# Patient Record
Sex: FEMALE | Race: BLACK OR AFRICAN AMERICAN | NOT HISPANIC OR LATINO
[De-identification: names, ages, dates, MRNs, and addresses within clinical notes are randomized per-mention and may not be internally consistent; named-entity substitution may affect disease eponyms.]

---

## 2020-11-12 PROBLEM — Z00.00 ENCOUNTER FOR PREVENTIVE HEALTH EXAMINATION: Status: ACTIVE | Noted: 2020-11-12

## 2020-11-16 DIAGNOSIS — R07.89 OTHER CHEST PAIN: ICD-10-CM

## 2020-11-16 DIAGNOSIS — R06.02 SHORTNESS OF BREATH: ICD-10-CM

## 2020-11-17 ENCOUNTER — APPOINTMENT (OUTPATIENT)
Dept: ELECTROPHYSIOLOGY | Facility: CLINIC | Age: 30
End: 2020-11-17
Payer: COMMERCIAL

## 2020-11-17 ENCOUNTER — APPOINTMENT (OUTPATIENT)
Dept: ELECTROPHYSIOLOGY | Facility: CLINIC | Age: 30
End: 2020-11-17

## 2020-11-17 ENCOUNTER — NON-APPOINTMENT (OUTPATIENT)
Age: 30
End: 2020-11-17

## 2020-11-17 VITALS
OXYGEN SATURATION: 97 % | HEIGHT: 65 IN | DIASTOLIC BLOOD PRESSURE: 98 MMHG | SYSTOLIC BLOOD PRESSURE: 139 MMHG | WEIGHT: 265 LBS | HEART RATE: 115 BPM | BODY MASS INDEX: 44.15 KG/M2

## 2020-11-17 DIAGNOSIS — Z82.49 FAMILY HISTORY OF ISCHEMIC HEART DISEASE AND OTHER DISEASES OF THE CIRCULATORY SYSTEM: ICD-10-CM

## 2020-11-17 DIAGNOSIS — Z87.09 PERSONAL HISTORY OF OTHER DISEASES OF THE RESPIRATORY SYSTEM: ICD-10-CM

## 2020-11-17 DIAGNOSIS — I10 ESSENTIAL (PRIMARY) HYPERTENSION: ICD-10-CM

## 2020-11-17 DIAGNOSIS — Z83.438 FAMILY HISTORY OF OTHER DISORDER OF LIPOPROTEIN METABOLISM AND OTHER LIPIDEMIA: ICD-10-CM

## 2020-11-17 DIAGNOSIS — Z78.9 OTHER SPECIFIED HEALTH STATUS: ICD-10-CM

## 2020-11-17 PROCEDURE — 99205 OFFICE O/P NEW HI 60 MIN: CPT

## 2020-11-17 PROCEDURE — 93000 ELECTROCARDIOGRAM COMPLETE: CPT

## 2020-11-20 NOTE — PHYSICAL EXAM
[General Appearance - Well Developed] : well developed [Normal Appearance] : normal appearance [Well Groomed] : well groomed [General Appearance - Well Nourished] : well nourished [No Deformities] : no deformities [General Appearance - In No Acute Distress] : no acute distress [Normal Oral Mucosa] : normal oral mucosa [No Oral Pallor] : no oral pallor [No Oral Cyanosis] : no oral cyanosis [Normal Jugular Venous A Waves Present] : normal jugular venous A waves present [Normal Jugular Venous V Waves Present] : normal jugular venous V waves present [No Jugular Venous Cooper A Waves] : no jugular venous cooper A waves [Heart Rate And Rhythm] : heart rate and rhythm were normal [Heart Sounds] : normal S1 and S2 [Murmurs] : no murmurs present [Respiration, Rhythm And Depth] : normal respiratory rhythm and effort [Exaggerated Use Of Accessory Muscles For Inspiration] : no accessory muscle use [Auscultation Breath Sounds / Voice Sounds] : lungs were clear to auscultation bilaterally [Abdomen Soft] : soft [Abdomen Tenderness] : non-tender [Abdomen Mass (___ Cm)] : no abdominal mass palpated [Abnormal Walk] : normal gait [Gait - Sufficient For Exercise Testing] : the gait was sufficient for exercise testing [Nail Clubbing] : no clubbing of the fingernails [Cyanosis, Localized] : no localized cyanosis [Petechial Hemorrhages (___cm)] : no petechial hemorrhages [Skin Color & Pigmentation] : normal skin color and pigmentation [] : no rash [No Venous Stasis] : no venous stasis [Skin Lesions] : no skin lesions [No Skin Ulcers] : no skin ulcer [No Xanthoma] : no  xanthoma was observed [Oriented To Time, Place, And Person] : oriented to person, place, and time [Affect] : the affect was normal [Mood] : the mood was normal [No Anxiety] : not feeling anxious

## 2020-11-20 NOTE — DISCUSSION/SUMMARY
[FreeTextEntry1] : Ms. Rosado is a very pleasant 30 year old woman with symptomatic ventricular arrhythmia, ie symptomatic NSVT on treadmill testing.  At this time we will start a low dose beta blocker.  I suggested a loop monitor to better qualify burden.   We will also do an MRI to better understand substrate, ie assure no scar.  We discussed possible EPS/ablation but such an intervention will depend on the evaluation.

## 2020-11-20 NOTE — HISTORY OF PRESENT ILLNESS
[FreeTextEntry1] : 30 year old woman with newly diagnosed hypertension went to see an MD for an evaluation of palpitations.  Symptoms are intermittent and date back approximately 2 months.  As part of her evaluation she had an ETT.  During the test she had symptoms that correlated to a run of ventricular tachycardia.  No lightheadedness/dizziness.  NO chest pain or shortness of breath.  She rides the [place] without complaints, but does note occasional shortness of breath while walking. \par \par Her father, who she did not live with, had a pacemaker and  at the age of 56.  She says that he had a "heart attack" but cannot elucidate any better.

## 2020-12-18 ENCOUNTER — RX RENEWAL (OUTPATIENT)
Age: 30
End: 2020-12-18

## 2021-01-07 ENCOUNTER — TRANSCRIPTION ENCOUNTER (OUTPATIENT)
Age: 31
End: 2021-01-07

## 2021-01-07 ENCOUNTER — OUTPATIENT (OUTPATIENT)
Dept: OUTPATIENT SERVICES | Facility: HOSPITAL | Age: 31
LOS: 1 days | End: 2021-01-07
Payer: COMMERCIAL

## 2021-01-07 ENCOUNTER — RESULT REVIEW (OUTPATIENT)
Age: 31
End: 2021-01-07

## 2021-01-07 ENCOUNTER — APPOINTMENT (OUTPATIENT)
Dept: MRI IMAGING | Facility: CLINIC | Age: 31
End: 2021-01-07
Payer: COMMERCIAL

## 2021-01-07 DIAGNOSIS — Z00.8 ENCOUNTER FOR OTHER GENERAL EXAMINATION: ICD-10-CM

## 2021-01-07 PROCEDURE — A9585: CPT

## 2021-01-07 PROCEDURE — 75561 CARDIAC MRI FOR MORPH W/DYE: CPT | Mod: 26

## 2021-01-07 PROCEDURE — 75561 CARDIAC MRI FOR MORPH W/DYE: CPT

## 2021-01-11 NOTE — PHYSICAL EXAM
[General Appearance - Well Developed] : well developed [Normal Appearance] : normal appearance [Well Groomed] : well groomed [General Appearance - Well Nourished] : well nourished [No Deformities] : no deformities [General Appearance - In No Acute Distress] : no acute distress [Normal Oral Mucosa] : normal oral mucosa [No Oral Pallor] : no oral pallor [No Oral Cyanosis] : no oral cyanosis [Normal Jugular Venous A Waves Present] : normal jugular venous A waves present [Normal Jugular Venous V Waves Present] : normal jugular venous V waves present [No Jugular Venous Cooper A Waves] : no jugular venous cooper A waves [Respiration, Rhythm And Depth] : normal respiratory rhythm and effort [Exaggerated Use Of Accessory Muscles For Inspiration] : no accessory muscle use [Auscultation Breath Sounds / Voice Sounds] : lungs were clear to auscultation bilaterally [Heart Rate And Rhythm] : heart rate and rhythm were normal [Heart Sounds] : normal S1 and S2 [Murmurs] : no murmurs present [Abdomen Soft] : soft [Abdomen Tenderness] : non-tender [Abdomen Mass (___ Cm)] : no abdominal mass palpated [Abnormal Walk] : normal gait [Gait - Sufficient For Exercise Testing] : the gait was sufficient for exercise testing [Nail Clubbing] : no clubbing of the fingernails [Cyanosis, Localized] : no localized cyanosis [Petechial Hemorrhages (___cm)] : no petechial hemorrhages [Skin Color & Pigmentation] : normal skin color and pigmentation [] : no rash [No Venous Stasis] : no venous stasis [Skin Lesions] : no skin lesions [No Skin Ulcers] : no skin ulcer [No Xanthoma] : no  xanthoma was observed [Oriented To Time, Place, And Person] : oriented to person, place, and time [Affect] : the affect was normal [Mood] : the mood was normal [No Anxiety] : not feeling anxious

## 2021-01-12 ENCOUNTER — APPOINTMENT (OUTPATIENT)
Dept: ELECTROPHYSIOLOGY | Facility: CLINIC | Age: 31
End: 2021-01-12
Payer: COMMERCIAL

## 2021-01-12 ENCOUNTER — NON-APPOINTMENT (OUTPATIENT)
Age: 31
End: 2021-01-12

## 2021-01-12 VITALS
DIASTOLIC BLOOD PRESSURE: 88 MMHG | HEIGHT: 65 IN | HEART RATE: 84 BPM | RESPIRATION RATE: 14 BRPM | BODY MASS INDEX: 44.15 KG/M2 | OXYGEN SATURATION: 95 % | SYSTOLIC BLOOD PRESSURE: 141 MMHG | WEIGHT: 265 LBS

## 2021-01-12 PROCEDURE — 99072 ADDL SUPL MATRL&STAF TM PHE: CPT

## 2021-01-12 PROCEDURE — 93000 ELECTROCARDIOGRAM COMPLETE: CPT

## 2021-01-12 PROCEDURE — 99213 OFFICE O/P EST LOW 20 MIN: CPT

## 2021-01-12 NOTE — DISCUSSION/SUMMARY
[FreeTextEntry1] : Ms. Rosado is a very pleasant 30 year old woman with symptomatic ventricular arrhythmia, ie symptomatic NSVT on treadmill testing.  Evaluation has proven a normal heart (MRI) and she has had a good response to beta blocker therapy, ie Zio with no NSVT, low burden of VE.  While we may consider ablation, the paucity of beats would make mapping difficult and translate to a low likelihood for success.  She requires no further EP evaluation prior to her planned bariatric procedure.

## 2021-01-12 NOTE — HISTORY OF PRESENT ILLNESS
[FreeTextEntry1] : No complaints. No longer having palpitations. She continues to do her exercise 4x/week.  NO change in exercise/effort tolerance.  No lightheadedness/dizziness.

## 2021-01-14 ENCOUNTER — RX RENEWAL (OUTPATIENT)
Age: 31
End: 2021-01-14

## 2021-01-28 DIAGNOSIS — Z01.818 ENCOUNTER FOR OTHER PREPROCEDURAL EXAMINATION: ICD-10-CM

## 2021-02-05 ENCOUNTER — APPOINTMENT (OUTPATIENT)
Dept: DISASTER EMERGENCY | Facility: CLINIC | Age: 31
End: 2021-02-05

## 2021-02-06 LAB — SARS-COV-2 N GENE NPH QL NAA+PROBE: NOT DETECTED

## 2021-02-08 ENCOUNTER — RESULT REVIEW (OUTPATIENT)
Age: 31
End: 2021-02-08

## 2021-07-13 ENCOUNTER — APPOINTMENT (OUTPATIENT)
Dept: ELECTROPHYSIOLOGY | Facility: CLINIC | Age: 31
End: 2021-07-13

## 2021-10-06 PROBLEM — I10 ESSENTIAL HYPERTENSION: Status: ACTIVE | Noted: 2020-11-17

## 2022-01-13 ENCOUNTER — ASOB RESULT (OUTPATIENT)
Age: 32
End: 2022-01-13

## 2022-01-13 ENCOUNTER — APPOINTMENT (OUTPATIENT)
Dept: ANTEPARTUM | Facility: CLINIC | Age: 32
End: 2022-01-13
Payer: COMMERCIAL

## 2022-01-13 PROCEDURE — 76813 OB US NUCHAL MEAS 1 GEST: CPT | Mod: 59

## 2022-01-13 PROCEDURE — 76801 OB US < 14 WKS SINGLE FETUS: CPT

## 2022-02-24 ENCOUNTER — APPOINTMENT (OUTPATIENT)
Dept: MATERNAL FETAL MEDICINE | Facility: CLINIC | Age: 32
End: 2022-02-24

## 2022-03-04 ENCOUNTER — RX RENEWAL (OUTPATIENT)
Age: 32
End: 2022-03-04

## 2022-03-14 ENCOUNTER — APPOINTMENT (OUTPATIENT)
Dept: ANTEPARTUM | Facility: CLINIC | Age: 32
End: 2022-03-14
Payer: COMMERCIAL

## 2022-03-14 ENCOUNTER — ASOB RESULT (OUTPATIENT)
Age: 32
End: 2022-03-14

## 2022-03-14 PROCEDURE — 99203 OFFICE O/P NEW LOW 30 MIN: CPT | Mod: 25

## 2022-03-14 PROCEDURE — 76811 OB US DETAILED SNGL FETUS: CPT

## 2022-04-07 ENCOUNTER — APPOINTMENT (OUTPATIENT)
Dept: ANTEPARTUM | Facility: CLINIC | Age: 32
End: 2022-04-07
Payer: COMMERCIAL

## 2022-04-07 ENCOUNTER — ASOB RESULT (OUTPATIENT)
Age: 32
End: 2022-04-07

## 2022-04-07 PROCEDURE — 76816 OB US FOLLOW-UP PER FETUS: CPT

## 2022-04-13 ENCOUNTER — NON-APPOINTMENT (OUTPATIENT)
Age: 32
End: 2022-04-13

## 2022-05-02 ENCOUNTER — APPOINTMENT (OUTPATIENT)
Dept: ANTEPARTUM | Facility: CLINIC | Age: 32
End: 2022-05-02
Payer: COMMERCIAL

## 2022-05-02 ENCOUNTER — ASOB RESULT (OUTPATIENT)
Age: 32
End: 2022-05-02

## 2022-05-02 PROCEDURE — 99215 OFFICE O/P EST HI 40 MIN: CPT | Mod: 25

## 2022-05-02 PROCEDURE — 76819 FETAL BIOPHYS PROFIL W/O NST: CPT

## 2022-05-02 PROCEDURE — 76816 OB US FOLLOW-UP PER FETUS: CPT

## 2022-05-23 ENCOUNTER — APPOINTMENT (OUTPATIENT)
Dept: PEDIATRIC UROLOGY | Facility: CLINIC | Age: 32
End: 2022-05-23
Payer: COMMERCIAL

## 2022-05-23 DIAGNOSIS — O35.9XX0 MATERNAL CARE FOR (SUSPECTED) FETAL ABNORMALITY AND DAMAGE, UNSPECIFIED, NOT APPLICABLE OR UNSPECIFIED: ICD-10-CM

## 2022-05-23 PROCEDURE — 99203 OFFICE O/P NEW LOW 30 MIN: CPT | Mod: 95

## 2022-05-23 NOTE — HISTORY OF PRESENT ILLNESS
[Home] : at home, [unfilled] , at the time of the visit. [Medical Office: ([facility])___] : at the medical office located in  [Verbal consent obtained from patient] : the patient, [unfilled] [TextBox_4] : I verified the identity of the patient and the reason for the appointment with the parent.  I explained  to the parent that telemedicine encounters are not the same as a direct patient/healthcare provider visit because the patient and healthcare provider are not in the same room, which can result in limitations, including with the physical examination.  I explained that the telemedicine encounter may require the patient’s genitalia to be shown.  I explained that after the telemedicine encounter, the patient may require an office visit for an in-person physical examination, ultrasound or other testing.  I informed the parent that there may be privacy risks associated with the use of the technology and that there may be costs associated with the encounter. I offered the option of an office visit rather than a telemedicine encounter.   Parent stated that all explanations were understood, and that all questions were answered to their satisfaction.  The parent verbalized their preference and consent to proceed with the telemedicine encounter.\par \par \par ARELI is here for an initial consultation. She is 31 weeks pregnant with her 1st pregnancy conceived naturally.  Right sided hydronephrosis was detected in utero at 20 weeks at 6.8mm, increased to 8.3mm at 24 weeks with a right lower pole simple cyst noted as well. At 28 weeks gestation there was no renal dilation noted, but the right lower pole simple cyst remained. The bladder has been noted to be empty on ultrasound and the amniotic fluid levels are normal. No other anomalies have been noted.  \par

## 2022-05-23 NOTE — ASSESSMENT
[FreeTextEntry1] : The female fetus as right sided hydronephrosis and possible lower pole cyst.  I discussed the anatomy using diagrams as well as the possible causes that will be determined after birth.  I recommended maintaining the pregnancy and avoiding early delivery given normal levels of amniotic fluid.\par \par The  care will include:\par 1. no prophylactic antibiotics\par 2. no ultrasound is needed after birth at the hospital\par 3. 1st renal-bladder sonogram will take place at the office visit at about 2 weeks of age. The imaging studies can be performed earlier if necessary. After better defining the anomaly, we can then proceed with any additional testing or procedures. I did outline these in general terms. \par \par All questions were answered. We will reconvene prior to the delivery or at the 2 week visit after the baby is born. \par

## 2022-05-23 NOTE — CONSULT LETTER
[FreeTextEntry1] : Dear Dr. Reyes ,\par \par I had the pleasure of consulting on ARELI RAMOSAND today.  Below is my note regarding the office visit today.\par \par Thank you so very much for allowing me to participate in ARELI's  care.  Please don't hesitate to call me should any questions or issues arise .\par \par Sincerely, \par \par Aleksandr\par \par Aleksandr Contreras MD, FACS, FSPU\par Chief, Pediatric Urology\par Professor of Urology and Pediatrics\par [employer] [employer] [facility] [employer]\par \par President, [employer] - [place] Section\par Past-President, Soci[employer] Pediatric U[employer]

## 2022-05-23 NOTE — REASON FOR VISIT
[Initial Consultation] : an initial consultation [Patient] : patient [TextBox_50] : in utero consultation

## 2022-06-02 ENCOUNTER — ASOB RESULT (OUTPATIENT)
Age: 32
End: 2022-06-02

## 2022-06-02 ENCOUNTER — APPOINTMENT (OUTPATIENT)
Dept: ANTEPARTUM | Facility: CLINIC | Age: 32
End: 2022-06-02
Payer: COMMERCIAL

## 2022-06-02 PROCEDURE — 76819 FETAL BIOPHYS PROFIL W/O NST: CPT

## 2022-06-02 PROCEDURE — 76816 OB US FOLLOW-UP PER FETUS: CPT

## 2022-07-01 ENCOUNTER — APPOINTMENT (OUTPATIENT)
Dept: ANTEPARTUM | Facility: CLINIC | Age: 32
End: 2022-07-01

## 2022-07-01 PROCEDURE — 99213 OFFICE O/P EST LOW 20 MIN: CPT | Mod: 25

## 2022-07-01 PROCEDURE — 76819 FETAL BIOPHYS PROFIL W/O NST: CPT

## 2022-07-01 PROCEDURE — 76816 OB US FOLLOW-UP PER FETUS: CPT

## 2022-07-21 ENCOUNTER — INPATIENT (INPATIENT)
Facility: HOSPITAL | Age: 32
LOS: 3 days | Discharge: HOME | End: 2022-07-25
Attending: STUDENT IN AN ORGANIZED HEALTH CARE EDUCATION/TRAINING PROGRAM | Admitting: STUDENT IN AN ORGANIZED HEALTH CARE EDUCATION/TRAINING PROGRAM

## 2022-07-21 VITALS — DIASTOLIC BLOOD PRESSURE: 65 MMHG | SYSTOLIC BLOOD PRESSURE: 108 MMHG | HEART RATE: 93 BPM | TEMPERATURE: 98 F

## 2022-07-21 DIAGNOSIS — Z90.3 ACQUIRED ABSENCE OF STOMACH [PART OF]: Chronic | ICD-10-CM

## 2022-07-21 DIAGNOSIS — O10.013 PRE-EXISTING ESSENTIAL HYPERTENSION COMPLICATING PREGNANCY, THIRD TRIMESTER: ICD-10-CM

## 2022-07-21 LAB
ALBUMIN SERPL ELPH-MCNC: 3.2 G/DL — LOW (ref 3.3–5)
ALP SERPL-CCNC: 127 U/L — HIGH (ref 40–120)
ALT FLD-CCNC: 6 U/L — SIGNIFICANT CHANGE UP (ref 4–33)
ANION GAP SERPL CALC-SCNC: 9 MMOL/L — SIGNIFICANT CHANGE UP (ref 7–14)
APPEARANCE UR: ABNORMAL
APTT BLD: 25.7 SEC — LOW (ref 27–36.3)
AST SERPL-CCNC: 20 U/L — SIGNIFICANT CHANGE UP (ref 4–32)
BACTERIA # UR AUTO: ABNORMAL
BASOPHILS # BLD AUTO: 0.02 K/UL — SIGNIFICANT CHANGE UP (ref 0–0.2)
BASOPHILS NFR BLD AUTO: 0.4 % — SIGNIFICANT CHANGE UP (ref 0–2)
BILIRUB SERPL-MCNC: <0.2 MG/DL — SIGNIFICANT CHANGE UP (ref 0.2–1.2)
BILIRUB UR-MCNC: NEGATIVE — SIGNIFICANT CHANGE UP
BLD GP AB SCN SERPL QL: NEGATIVE — SIGNIFICANT CHANGE UP
BUN SERPL-MCNC: 9 MG/DL — SIGNIFICANT CHANGE UP (ref 7–23)
CALCIUM SERPL-MCNC: 8.2 MG/DL — LOW (ref 8.4–10.5)
CHLORIDE SERPL-SCNC: 104 MMOL/L — SIGNIFICANT CHANGE UP (ref 98–107)
CO2 SERPL-SCNC: 20 MMOL/L — LOW (ref 22–31)
COLOR SPEC: YELLOW — SIGNIFICANT CHANGE UP
CREAT ?TM UR-MCNC: 250 MG/DL — SIGNIFICANT CHANGE UP
CREAT SERPL-MCNC: 0.69 MG/DL — SIGNIFICANT CHANGE UP (ref 0.5–1.3)
DIFF PNL FLD: NEGATIVE — SIGNIFICANT CHANGE UP
EGFR: 117 ML/MIN/1.73M2 — SIGNIFICANT CHANGE UP
EOSINOPHIL # BLD AUTO: 0.05 K/UL — SIGNIFICANT CHANGE UP (ref 0–0.5)
EOSINOPHIL NFR BLD AUTO: 1 % — SIGNIFICANT CHANGE UP (ref 0–6)
EPI CELLS # UR: 7 /HPF — HIGH (ref 0–5)
FIBRINOGEN PPP-MCNC: 504 MG/DL — SIGNIFICANT CHANGE UP (ref 330–520)
GLUCOSE SERPL-MCNC: 88 MG/DL — SIGNIFICANT CHANGE UP (ref 70–99)
GLUCOSE UR QL: NEGATIVE — SIGNIFICANT CHANGE UP
HCT VFR BLD CALC: 33.6 % — LOW (ref 34.5–45)
HGB BLD-MCNC: 11 G/DL — LOW (ref 11.5–15.5)
HYALINE CASTS # UR AUTO: 0 /LPF — SIGNIFICANT CHANGE UP (ref 0–7)
IANC: 2.95 K/UL — SIGNIFICANT CHANGE UP (ref 1.8–7.4)
IMM GRANULOCYTES NFR BLD AUTO: 0.6 % — SIGNIFICANT CHANGE UP (ref 0–1.5)
INR BLD: 0.94 RATIO — SIGNIFICANT CHANGE UP (ref 0.88–1.16)
KETONES UR-MCNC: ABNORMAL
LDH SERPL L TO P-CCNC: 185 U/L — SIGNIFICANT CHANGE UP (ref 135–225)
LEUKOCYTE ESTERASE UR-ACNC: ABNORMAL
LYMPHOCYTES # BLD AUTO: 1.58 K/UL — SIGNIFICANT CHANGE UP (ref 1–3.3)
LYMPHOCYTES # BLD AUTO: 30.7 % — SIGNIFICANT CHANGE UP (ref 13–44)
MCHC RBC-ENTMCNC: 30.3 PG — SIGNIFICANT CHANGE UP (ref 27–34)
MCHC RBC-ENTMCNC: 32.7 GM/DL — SIGNIFICANT CHANGE UP (ref 32–36)
MCV RBC AUTO: 92.6 FL — SIGNIFICANT CHANGE UP (ref 80–100)
MONOCYTES # BLD AUTO: 0.51 K/UL — SIGNIFICANT CHANGE UP (ref 0–0.9)
MONOCYTES NFR BLD AUTO: 9.9 % — SIGNIFICANT CHANGE UP (ref 2–14)
NEUTROPHILS # BLD AUTO: 2.95 K/UL — SIGNIFICANT CHANGE UP (ref 1.8–7.4)
NEUTROPHILS NFR BLD AUTO: 57.4 % — SIGNIFICANT CHANGE UP (ref 43–77)
NITRITE UR-MCNC: NEGATIVE — SIGNIFICANT CHANGE UP
NRBC # BLD: 0 /100 WBCS — SIGNIFICANT CHANGE UP
NRBC # FLD: 0 K/UL — SIGNIFICANT CHANGE UP
PH UR: 6.5 — SIGNIFICANT CHANGE UP (ref 5–8)
PLATELET # BLD AUTO: 170 K/UL — SIGNIFICANT CHANGE UP (ref 150–400)
POTASSIUM SERPL-MCNC: 4.2 MMOL/L — SIGNIFICANT CHANGE UP (ref 3.5–5.3)
POTASSIUM SERPL-SCNC: 4.2 MMOL/L — SIGNIFICANT CHANGE UP (ref 3.5–5.3)
PROT ?TM UR-MCNC: 34 MG/DL — SIGNIFICANT CHANGE UP
PROT ?TM UR-MCNC: 34 MG/DL — SIGNIFICANT CHANGE UP
PROT SERPL-MCNC: 6 G/DL — SIGNIFICANT CHANGE UP (ref 6–8.3)
PROT UR-MCNC: ABNORMAL
PROT/CREAT UR-RTO: 0.1 RATIO — SIGNIFICANT CHANGE UP (ref 0–0.2)
PROTHROM AB SERPL-ACNC: 10.9 SEC — SIGNIFICANT CHANGE UP (ref 10.5–13.4)
RBC # BLD: 3.63 M/UL — LOW (ref 3.8–5.2)
RBC # FLD: 14.3 % — SIGNIFICANT CHANGE UP (ref 10.3–14.5)
RBC CASTS # UR COMP ASSIST: 3 /HPF — SIGNIFICANT CHANGE UP (ref 0–4)
RH IG SCN BLD-IMP: NEGATIVE — SIGNIFICANT CHANGE UP
RH IG SCN BLD-IMP: NEGATIVE — SIGNIFICANT CHANGE UP
SODIUM SERPL-SCNC: 133 MMOL/L — LOW (ref 135–145)
SP GR SPEC: 1.03 — SIGNIFICANT CHANGE UP (ref 1–1.05)
URATE SERPL-MCNC: 3.6 MG/DL — SIGNIFICANT CHANGE UP (ref 2.5–7)
UROBILINOGEN FLD QL: ABNORMAL
WBC # BLD: 5.14 K/UL — SIGNIFICANT CHANGE UP (ref 3.8–10.5)
WBC # FLD AUTO: 5.14 K/UL — SIGNIFICANT CHANGE UP (ref 3.8–10.5)
WBC UR QL: 45 /HPF — HIGH (ref 0–5)

## 2022-07-21 RX ORDER — CHLORHEXIDINE GLUCONATE 213 G/1000ML
1 SOLUTION TOPICAL ONCE
Refills: 0 | Status: DISCONTINUED | OUTPATIENT
Start: 2022-07-21 | End: 2022-07-22

## 2022-07-21 RX ORDER — SODIUM CHLORIDE 9 MG/ML
1000 INJECTION, SOLUTION INTRAVENOUS
Refills: 0 | Status: DISCONTINUED | OUTPATIENT
Start: 2022-07-21 | End: 2022-07-22

## 2022-07-21 RX ORDER — OXYTOCIN 10 UNIT/ML
333.33 VIAL (ML) INJECTION
Qty: 20 | Refills: 0 | Status: DISCONTINUED | OUTPATIENT
Start: 2022-07-21 | End: 2022-07-22

## 2022-07-21 RX ORDER — CITRIC ACID/SODIUM CITRATE 300-500 MG
15 SOLUTION, ORAL ORAL EVERY 6 HOURS
Refills: 0 | Status: DISCONTINUED | OUTPATIENT
Start: 2022-07-21 | End: 2022-07-22

## 2022-07-21 RX ORDER — FAMOTIDINE 10 MG/ML
20 INJECTION INTRAVENOUS
Refills: 0 | Status: DISCONTINUED | OUTPATIENT
Start: 2022-07-21 | End: 2022-07-22

## 2022-07-21 RX ORDER — FAMOTIDINE 10 MG/ML
20 INJECTION INTRAVENOUS
Refills: 0 | Status: DISCONTINUED | OUTPATIENT
Start: 2022-07-21 | End: 2022-07-21

## 2022-07-21 RX ADMIN — SODIUM CHLORIDE 125 MILLILITER(S): 9 INJECTION, SOLUTION INTRAVENOUS at 20:43

## 2022-07-21 RX ADMIN — FAMOTIDINE 20 MILLIGRAM(S): 10 INJECTION INTRAVENOUS at 17:50

## 2022-07-21 NOTE — OB PROVIDER H&P - HISTORY OF PRESENT ILLNESS
HPI: Pt is a 32yo  at 40w presenting for IOL for cHTN.  +FM, -LOF, -CTX -VB    Prenatal Issues:   cHTN (not on medications), anemia (s/p 5 iron infusions this pregnancy)  GBS neg  EFW 3600    OBHx:  -     Gyn Hx:  - Denies hx of fibroids, ovarian cysts, PID, STDs, abnormal paps, HPV    PMH:   - cHTN (on amlodipine in the past, not since gastric sleeve)  - hx of heart palpitations    SHx:  - Gastric sleeve () - no complications    Psych:   - Denies h/o depression/anxiety      Medications: PNV, ASA 81mg, B6, Pepcid    Allergies: None    Will Accept blood transfusion HPI: Pt is a 32yo  at 40w presenting for IOL for cHTN. Pregnancy complicated by anemia (s/p 5 iron infusions this pregnancy). No complaints today. Denies HA, changes in vision, blurry vision, SOB, CP, abdominal pain, n/v, diarrhea/constipation.  +FM, -LOF, -CTX -VB  GBS neg  EFW 3600    PMHx: cHTN (on amlodipine in the past, but not since gastric sleeve), anemia, hx of heart palpitations (was followed by cardiology and previously on metoprolol, but not for several years per patient)  PSHx: gastric sleeve (), D&C x1 (doesn't remember year)  OBHx:  (abortions x2 s/p D&C x1)  GynHx: Denies hx of fibroids, ovarian cysts, PID, STDs, abnormal paps, HPV  Psych: Denies h/o depression/anxiety  Medications: PNV, ASA 81mg, B6, Pepcid  Allergies: None

## 2022-07-21 NOTE — OB PROVIDER H&P - ASSESSMENT
A/P: Pt is a 30yo  who presents for induction of labor for cHTN.     1. Admit to LND. Routine Labs. IVF  2. IOL: po Cytotec  3. Fetus: Cat 1 tracing, Vertex, EFW 3600. C/w EFM.  4. cHTN: HELLP labs ordered, continue to monitor  5. Anemia (s/p 5 iron transfusions this pregnancy). H/H stable on admission  6. GBS neg  7. Pain: IV pain meds/epidural TBD    d/w Dr. Roderick Payne, PGY-1       A/P: Pt is a 32yo  who presents for induction of labor for cHTN.     1. Admit to LND. Routine Labs. IVF  2. IOL: po Cytotec, CB  3. Fetus: Cat 1 tracing, Vertex, EFW 3600. C/w EFM.  4. cHTN: HELLP labs ordered, continue to monitor  5. Anemia (s/p 5 iron transfusions this pregnancy). H/H stable on admission (11.0/33.6)  6. GBS neg  7. Pain: IV pain meds/epidural TBD    d/w Dr. Roderick Payne, PGY-1

## 2022-07-21 NOTE — OB PROVIDER H&P - NSHPPHYSICALEXAM_GEN_ALL_CORE
Vital Signs Last 24 Hrs  T(C): 36.8 (21 Jul 2022 15:53), Max: 36.8 (21 Jul 2022 15:48)  T(F): 98.2 (21 Jul 2022 15:53), Max: 98.24 (21 Jul 2022 15:48)  HR: 93 (21 Jul 2022 15:53) (93 - 93)  BP: 108/65 (21 Jul 2022 15:53) (108/65 - 108/65)  RR: 15 (21 Jul 2022 15:53) (15 - 15)      Physical Exam:  Gen: NAD, AOx3  CV: RR  Resp: unlabored respirations  Abd: soft, NT, ND    SVE: 0.5/50/-3  FHT: 140 bpm, mod variability, +accels, no decels  [facility]: irregular contractions

## 2022-07-21 NOTE — OB RN PATIENT PROFILE - NSMATERNALFETALCONCERNS_OBGYN_ALL_OB_FT
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   Simple cyst in lower pole of right kidney persists  . MFM recommneds post amanda evalaution prior to hospital discharge   patient had early Peds Urology consult   Left kidney WNL   Anne Diego RN 2022   Peds Urology consult 2022

## 2022-07-21 NOTE — OB RN PATIENT PROFILE - NSICDXPASTMEDICALHX_GEN_ALL_CORE_FT
PAST MEDICAL HISTORY:  Anemia     Termination of pregnancy (fetus) x2 D&C x1     PAST MEDICAL HISTORY:  Anemia     Heart palpitations     Termination of pregnancy (fetus) x2 D&C x1

## 2022-07-21 NOTE — OB PROVIDER H&P - NSOBVTERISKASSESS_OBGYN_ALL_OB_FT
VTE Present on Admission, Assessment Completed on: 21-Jul-2022 18:31 OBAntePartum Assessment Completed on: 21-Jul-2022 19:34

## 2022-07-21 NOTE — OB PROVIDER H&P - NSMATERNALFETALCONCERNS_OBGYN_ALL_OB_FT
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   Simple cyst in lower pole of right kidney persists  . MFM recommneds post amanda evalaution prior to hospital discharge   patient had early Peds Urology consult   Left kidney WNL   Anne iDego RN 2022   Peds Urology consult 2022

## 2022-07-22 ENCOUNTER — TRANSCRIPTION ENCOUNTER (OUTPATIENT)
Age: 32
End: 2022-07-22

## 2022-07-22 LAB
COVID-19 SPIKE DOMAIN AB INTERP: POSITIVE
COVID-19 SPIKE DOMAIN ANTIBODY RESULT: >250 U/ML — HIGH
KLEIHAUER-BETKE CALCULATION: 0 % — SIGNIFICANT CHANGE UP
SARS-COV-2 IGG+IGM SERPL QL IA: >250 U/ML — HIGH
SARS-COV-2 IGG+IGM SERPL QL IA: POSITIVE
T PALLIDUM AB TITR SER: NEGATIVE — SIGNIFICANT CHANGE UP

## 2022-07-22 RX ORDER — IBUPROFEN 200 MG
1 TABLET ORAL
Qty: 0 | Refills: 0 | DISCHARGE
Start: 2022-07-22

## 2022-07-22 RX ORDER — SIMETHICONE 80 MG/1
80 TABLET, CHEWABLE ORAL EVERY 4 HOURS
Refills: 0 | Status: DISCONTINUED | OUTPATIENT
Start: 2022-07-22 | End: 2022-07-25

## 2022-07-22 RX ORDER — IBUPROFEN 200 MG
600 TABLET ORAL EVERY 6 HOURS
Refills: 0 | Status: COMPLETED | OUTPATIENT
Start: 2022-07-22 | End: 2023-06-20

## 2022-07-22 RX ORDER — OXYCODONE HYDROCHLORIDE 5 MG/1
5 TABLET ORAL ONCE
Refills: 0 | Status: DISCONTINUED | OUTPATIENT
Start: 2022-07-22 | End: 2022-07-25

## 2022-07-22 RX ORDER — LANOLIN
1 OINTMENT (GRAM) TOPICAL EVERY 6 HOURS
Refills: 0 | Status: DISCONTINUED | OUTPATIENT
Start: 2022-07-22 | End: 2022-07-25

## 2022-07-22 RX ORDER — ACETAMINOPHEN 500 MG
3 TABLET ORAL
Qty: 0 | Refills: 0 | DISCHARGE
Start: 2022-07-22

## 2022-07-22 RX ORDER — AER TRAVELER 0.5 G/1
1 SOLUTION RECTAL; TOPICAL
Qty: 0 | Refills: 0 | DISCHARGE
Start: 2022-07-22

## 2022-07-22 RX ORDER — TETANUS TOXOID, REDUCED DIPHTHERIA TOXOID AND ACELLULAR PERTUSSIS VACCINE, ADSORBED 5; 2.5; 8; 8; 2.5 [IU]/.5ML; [IU]/.5ML; UG/.5ML; UG/.5ML; UG/.5ML
0.5 SUSPENSION INTRAMUSCULAR ONCE
Refills: 0 | Status: DISCONTINUED | OUTPATIENT
Start: 2022-07-22 | End: 2022-07-25

## 2022-07-22 RX ORDER — ACETAMINOPHEN 500 MG
975 TABLET ORAL
Refills: 0 | Status: DISCONTINUED | OUTPATIENT
Start: 2022-07-22 | End: 2022-07-25

## 2022-07-22 RX ORDER — AER TRAVELER 0.5 G/1
1 SOLUTION RECTAL; TOPICAL EVERY 4 HOURS
Refills: 0 | Status: DISCONTINUED | OUTPATIENT
Start: 2022-07-22 | End: 2022-07-25

## 2022-07-22 RX ORDER — OXYCODONE HYDROCHLORIDE 5 MG/1
5 TABLET ORAL
Refills: 0 | Status: DISCONTINUED | OUTPATIENT
Start: 2022-07-22 | End: 2022-07-25

## 2022-07-22 RX ORDER — HYDROCORTISONE 1 %
1 OINTMENT (GRAM) TOPICAL EVERY 6 HOURS
Refills: 0 | Status: DISCONTINUED | OUTPATIENT
Start: 2022-07-22 | End: 2022-07-25

## 2022-07-22 RX ORDER — SODIUM CHLORIDE 9 MG/ML
3 INJECTION INTRAMUSCULAR; INTRAVENOUS; SUBCUTANEOUS EVERY 8 HOURS
Refills: 0 | Status: DISCONTINUED | OUTPATIENT
Start: 2022-07-22 | End: 2022-07-25

## 2022-07-22 RX ORDER — BENZOCAINE 10 %
1 GEL (GRAM) MUCOUS MEMBRANE
Qty: 0 | Refills: 0 | DISCHARGE
Start: 2022-07-22

## 2022-07-22 RX ORDER — BENZOCAINE 10 %
1 GEL (GRAM) MUCOUS MEMBRANE EVERY 6 HOURS
Refills: 0 | Status: DISCONTINUED | OUTPATIENT
Start: 2022-07-22 | End: 2022-07-25

## 2022-07-22 RX ORDER — DIPHENHYDRAMINE HCL 50 MG
25 CAPSULE ORAL EVERY 6 HOURS
Refills: 0 | Status: DISCONTINUED | OUTPATIENT
Start: 2022-07-22 | End: 2022-07-25

## 2022-07-22 RX ORDER — DIBUCAINE 1 %
1 OINTMENT (GRAM) RECTAL EVERY 6 HOURS
Refills: 0 | Status: DISCONTINUED | OUTPATIENT
Start: 2022-07-22 | End: 2022-07-25

## 2022-07-22 RX ORDER — OXYTOCIN 10 UNIT/ML
333.33 VIAL (ML) INJECTION
Qty: 20 | Refills: 0 | Status: DISCONTINUED | OUTPATIENT
Start: 2022-07-22 | End: 2022-07-22

## 2022-07-22 RX ORDER — PRAMOXINE HYDROCHLORIDE 150 MG/15G
1 AEROSOL, FOAM RECTAL EVERY 4 HOURS
Refills: 0 | Status: DISCONTINUED | OUTPATIENT
Start: 2022-07-22 | End: 2022-07-25

## 2022-07-22 RX ORDER — DIBUCAINE 1 %
1 OINTMENT (GRAM) RECTAL
Qty: 0 | Refills: 0 | DISCHARGE
Start: 2022-07-22

## 2022-07-22 RX ORDER — IBUPROFEN 200 MG
600 TABLET ORAL EVERY 6 HOURS
Refills: 0 | Status: DISCONTINUED | OUTPATIENT
Start: 2022-07-22 | End: 2022-07-25

## 2022-07-22 RX ORDER — MAGNESIUM HYDROXIDE 400 MG/1
30 TABLET, CHEWABLE ORAL
Refills: 0 | Status: DISCONTINUED | OUTPATIENT
Start: 2022-07-22 | End: 2022-07-25

## 2022-07-22 RX ORDER — KETOROLAC TROMETHAMINE 30 MG/ML
30 SYRINGE (ML) INJECTION ONCE
Refills: 0 | Status: DISCONTINUED | OUTPATIENT
Start: 2022-07-22 | End: 2022-07-23

## 2022-07-22 RX ORDER — OXYTOCIN 10 UNIT/ML
VIAL (ML) INJECTION
Qty: 30 | Refills: 0 | Status: DISCONTINUED | OUTPATIENT
Start: 2022-07-22 | End: 2022-07-22

## 2022-07-22 RX ORDER — LANOLIN
1 OINTMENT (GRAM) TOPICAL
Qty: 0 | Refills: 0 | DISCHARGE
Start: 2022-07-22

## 2022-07-22 RX ADMIN — Medication 1000 MILLIUNIT(S)/MIN: at 09:00

## 2022-07-22 RX ADMIN — SODIUM CHLORIDE 3 MILLILITER(S): 9 INJECTION INTRAMUSCULAR; INTRAVENOUS; SUBCUTANEOUS at 13:26

## 2022-07-22 RX ADMIN — Medication 600 MILLIGRAM(S): at 17:58

## 2022-07-22 RX ADMIN — Medication 2 MILLIUNIT(S)/MIN: at 03:02

## 2022-07-22 RX ADMIN — Medication 975 MILLIGRAM(S): at 22:10

## 2022-07-22 RX ADMIN — Medication 1 TABLET(S): at 13:28

## 2022-07-22 RX ADMIN — Medication 600 MILLIGRAM(S): at 18:59

## 2022-07-22 RX ADMIN — Medication 975 MILLIGRAM(S): at 21:40

## 2022-07-22 RX ADMIN — SODIUM CHLORIDE 3 MILLILITER(S): 9 INJECTION INTRAMUSCULAR; INTRAVENOUS; SUBCUTANEOUS at 21:31

## 2022-07-22 NOTE — DISCHARGE NOTE OB - CARE PLAN
Assessment and plan of treatment:	as above   1 Principal Discharge DX:	Normal vaginal delivery  Assessment and plan of treatment:	as above

## 2022-07-22 NOTE — DISCHARGE NOTE OB - PATIENT PORTAL LINK FT
You can access the FollowMyHealth Patient Portal offered by [facility] by registering at the following website: [email or web address]. By joining [employer]’s FollowMyHealth portal, you will also be able to view your health information using other applications (apps) compatible with our system.

## 2022-07-22 NOTE — DISCHARGE NOTE OB - HOSPITAL COURSE
Spontaneous vaginal delivery of viable liveborn female infant.   Head, shoulders, and body delivered without complications.  Infant was suctioned and passed to mother.   Delayed cord clamping performed, then clamped and cut.   Placenta delivered intact with a 3-vessel cord.   Fundal massage was given and uterine fundus was noted to be firm.   Vaginal exam revealed an intact cervix, vaginal walls, and sulci.   Patient has a 1st degree laceration that was repaired with 2-0 chromic suture. Clitoral rai laceration repaired with 3-0 vicryl.  Excellent hemostasis noted.  Patient was stable and started postpartum recovery in room.   Count was correct x 2.     Infant: Female  Apgar: 9/9  EBL: 168

## 2022-07-22 NOTE — OB RN DELIVERY SUMMARY - NSSELHIDDEN_OBGYN_ALL_OB_FT
[NS_DeliveryAttending1_OBGYN_ALL_OB_FT:[id number]],[NS_DeliveryRN_OBGYN_ALL_OB_FT:[id number]=]

## 2022-07-22 NOTE — OB RN DELIVERY SUMMARY - NS_SEPSISRSKCALC_OBGYN_ALL_OB_FT
EOS calculated successfully. EOS Risk Factor: 0.5/1000 live births ([facility] national incidence); GA=40w1d; Temp=98.42; ROM=10.9; GBS='Negative'; Antibiotics='No antibiotics or any antibiotics < 2 hrs prior to birth'

## 2022-07-22 NOTE — DISCHARGE NOTE OB - CARE PROVIDER_API CALL
Louise Jaimes)  Obstetrics and Gynecology  [address] [place] [address]  Phone: [phone]  Fax: [phone]  Follow Up Time:

## 2022-07-22 NOTE — DISCHARGE NOTE OB - NS MD DC FALL RISK RISK
For information on Fall & Injury Prevention, visit: [email or web address] OR  [email or web address] OR  https://www.cdc.gov/steadi/patient.html

## 2022-07-22 NOTE — DISCHARGE NOTE OB - MEDICATION SUMMARY - MEDICATIONS TO TAKE
I will START or STAY ON the medications listed below when I get home from the hospital:    acetaminophen 325 mg oral tablet  -- 3 tab(s) by mouth every 6 hours, As Needed  -- Indication: For     ibuprofen 600 mg oral tablet  -- 1 tab(s) by mouth every 6 hours, As Needed  -- Indication: For     benzocaine 20% topical spray  -- 1 spray(s) on skin every 6 hours, As needed, for Perineal discomfort  -- Indication: For     dibucaine 1% topical ointment  -- 1 application on skin every 6 hours, As needed, Perineal discomfort  -- Indication: For     lanolin topical ointment  -- 1 application on skin every 6 hours, As needed, nipple soreness  -- Indication: For     witch hazel 50% topical pad  -- 1 application on skin every 4 hours, As needed, Perineal discomfort  -- Indication: For

## 2022-07-22 NOTE — OB NEONATOLOGY/PEDIATRICIAN DELIVERY SUMMARY - NSPEDSNEONOTESA_OBGYN_ALL_OB_FT
Peds called to delivery for category 2 tracing. 40.1 wk female born via  to a 32 y/o  blood type B- mother. Maternal history of anemia, amlodipine prior to pregnancy, ASA use during pregnancy. Prenatal hx significant for fetal alert for R kidney hydronephrosis, multicystic pelvic kidney on 22 scan with MFM. Also simple cyst in lower pole of R kidney. MFM recommended post  evaluation prior to discharge. Pt also saw peds urology on 2022. Left kidney wnl. PNL -/-/NR/I, GBS - on . SROM at 19:44 with clear fluids, approx. 11 hrs. Baby emerged vigorous, crying, was w/d/s/s with APGARS of 9/9. Mom plans to initiate breastfeeding and formula feed, and consents Hep B vaccine. EOS 0.21. COVID negative.

## 2022-07-22 NOTE — OB PROVIDER LABOR PROGRESS NOTE - ASSESSMENT
A/P:   - Labor:  on PO, pt making good change and feeling increased pain/pressure. Continue PO  - Fetus: Cat 2  - Pain: pt has epidural, still very uncomfortable, called for top off    Roberta Land, PGY-1

## 2022-07-23 RX ADMIN — Medication 600 MILLIGRAM(S): at 07:12

## 2022-07-23 RX ADMIN — Medication 600 MILLIGRAM(S): at 23:56

## 2022-07-23 RX ADMIN — Medication 600 MILLIGRAM(S): at 19:10

## 2022-07-23 RX ADMIN — Medication 975 MILLIGRAM(S): at 10:10

## 2022-07-23 RX ADMIN — Medication 1 TABLET(S): at 11:53

## 2022-07-23 RX ADMIN — OXYCODONE HYDROCHLORIDE 5 MILLIGRAM(S): 5 TABLET ORAL at 07:15

## 2022-07-23 RX ADMIN — Medication 600 MILLIGRAM(S): at 11:53

## 2022-07-23 RX ADMIN — Medication 975 MILLIGRAM(S): at 04:07

## 2022-07-23 RX ADMIN — OXYCODONE HYDROCHLORIDE 5 MILLIGRAM(S): 5 TABLET ORAL at 11:53

## 2022-07-23 RX ADMIN — Medication 975 MILLIGRAM(S): at 16:30

## 2022-07-23 RX ADMIN — OXYCODONE HYDROCHLORIDE 5 MILLIGRAM(S): 5 TABLET ORAL at 12:30

## 2022-07-23 RX ADMIN — Medication 975 MILLIGRAM(S): at 15:52

## 2022-07-23 RX ADMIN — Medication 600 MILLIGRAM(S): at 06:44

## 2022-07-23 RX ADMIN — Medication 975 MILLIGRAM(S): at 21:39

## 2022-07-23 RX ADMIN — Medication 975 MILLIGRAM(S): at 22:09

## 2022-07-23 RX ADMIN — Medication 600 MILLIGRAM(S): at 01:26

## 2022-07-23 RX ADMIN — Medication 600 MILLIGRAM(S): at 12:30

## 2022-07-23 RX ADMIN — Medication 975 MILLIGRAM(S): at 03:37

## 2022-07-23 RX ADMIN — Medication 600 MILLIGRAM(S): at 18:35

## 2022-07-23 RX ADMIN — OXYCODONE HYDROCHLORIDE 5 MILLIGRAM(S): 5 TABLET ORAL at 06:44

## 2022-07-23 RX ADMIN — SODIUM CHLORIDE 3 MILLILITER(S): 9 INJECTION INTRAMUSCULAR; INTRAVENOUS; SUBCUTANEOUS at 21:15

## 2022-07-23 RX ADMIN — Medication 600 MILLIGRAM(S): at 01:55

## 2022-07-23 RX ADMIN — MAGNESIUM HYDROXIDE 30 MILLILITER(S): 400 TABLET, CHEWABLE ORAL at 11:53

## 2022-07-23 RX ADMIN — Medication 975 MILLIGRAM(S): at 09:34

## 2022-07-23 RX ADMIN — SODIUM CHLORIDE 3 MILLILITER(S): 9 INJECTION INTRAMUSCULAR; INTRAVENOUS; SUBCUTANEOUS at 05:16

## 2022-07-23 NOTE — LACTATION INITIAL EVALUATION - LACTATION INTERVENTIONS
initiate/review safe skin-to-skin/initiate/review hand expression/initiate/review techniques for position and latch/initiate/review nipple shield use/reviewed components of an effective feeding and at least 8 effective feedings per day required/reviewed importance of monitoring infant diapers, the breastfeeding log, and minimum output each day/reviewed benefits and recommendations for rooming in/reviewed feeding on demand/by cue at least 8 times a day/reviewed indications of inadequate milk transfer that would require supplementation

## 2022-07-23 NOTE — LACTATION INITIAL EVALUATION - INTERVENTION OUTCOME
verbalizes understanding/demonstrates understanding of teaching/good return demonstration/needs met/Lactation team to follow up Utilized Guide to Postpartum and [place] [facility] book as a visual reference for mom to better understand teaching points and to utilize at home  to review the education presented during hospitalization. mother with flat nipples, instructed to use nipple nipple shield given to pt. with instructions in use and cleaning.  Pt. exhibited understanding. given to pt. with instructions in use and cleaning.  Pt. exhibited understanding. +latch, +colostrum with hand expression. mother encouraged to use pumpafter each feeding. lactation team to follow up./verbalizes understanding/demonstrates understanding of teaching/good return demonstration/needs met/Lactation team to follow up

## 2022-07-24 PROCEDURE — 72170 X-RAY EXAM OF PELVIS: CPT | Mod: 26

## 2022-07-24 RX ADMIN — Medication 600 MILLIGRAM(S): at 23:50

## 2022-07-24 RX ADMIN — Medication 975 MILLIGRAM(S): at 20:12

## 2022-07-24 RX ADMIN — Medication 975 MILLIGRAM(S): at 20:45

## 2022-07-24 RX ADMIN — Medication 975 MILLIGRAM(S): at 11:11

## 2022-07-24 RX ADMIN — Medication 600 MILLIGRAM(S): at 17:35

## 2022-07-24 RX ADMIN — Medication 600 MILLIGRAM(S): at 06:40

## 2022-07-24 RX ADMIN — Medication 975 MILLIGRAM(S): at 14:50

## 2022-07-24 RX ADMIN — Medication 600 MILLIGRAM(S): at 23:18

## 2022-07-24 RX ADMIN — Medication 975 MILLIGRAM(S): at 02:56

## 2022-07-24 RX ADMIN — Medication 600 MILLIGRAM(S): at 12:51

## 2022-07-24 RX ADMIN — Medication 975 MILLIGRAM(S): at 03:26

## 2022-07-24 RX ADMIN — Medication 1 TABLET(S): at 12:51

## 2022-07-24 RX ADMIN — Medication 975 MILLIGRAM(S): at 10:20

## 2022-07-24 RX ADMIN — SODIUM CHLORIDE 3 MILLILITER(S): 9 INJECTION INTRAMUSCULAR; INTRAVENOUS; SUBCUTANEOUS at 06:31

## 2022-07-24 RX ADMIN — Medication 600 MILLIGRAM(S): at 06:10

## 2022-07-24 RX ADMIN — Medication 975 MILLIGRAM(S): at 14:17

## 2022-07-24 RX ADMIN — Medication 600 MILLIGRAM(S): at 13:30

## 2022-07-24 RX ADMIN — SIMETHICONE 80 MILLIGRAM(S): 80 TABLET, CHEWABLE ORAL at 12:51

## 2022-07-24 RX ADMIN — Medication 600 MILLIGRAM(S): at 00:26

## 2022-07-24 RX ADMIN — Medication 600 MILLIGRAM(S): at 18:20

## 2022-07-24 NOTE — PROGRESS NOTE ADULT - SUBJECTIVE AND OBJECTIVE BOX
Post-partum Note,   She is a  31y woman who is now post-partum day: 2    Subjective:  She is tolerating regular diet.  She denies nausea and vomiting; denies fever.  She is voiding.  She reports normal postpartum bleeding.  She is breastfeeding and formula feeding    Physical exam:    Vital Signs Last 24 Hrs  T(C): 36.6 (2022 06:58), Max: 36.9 (2022 02:50)  T(F): 97.9 (2022 06:58), Max: 98.5 (2022 02:50)  HR: 71 (2022 06:58) (69 - 113)  BP: 112/68 (2022 06:58) (112/68 - 117/78)  BP(mean): --  RR: 16 (2022 06:58) (16 - 19)  SpO2: 100% (2022 06:58) (98% - 100%)    Parameters below as of 2022 14:04  Patient On (Oxygen Delivery Method): room air        Gen: NAD  Breast: Soft, nontender, not engorged.  Abdomen: Soft, nontender, no distension , firm uterine fundus at umbilicus.  Pelvic: Normal lochia noted  Ext: No calf tenderness    LABS:      Rubella status:     Allergies    No Known Allergies    Intolerances      MEDICATIONS  (STANDING):  acetaminophen     Tablet .. 975 milliGRAM(s) Oral <User Schedule>  diphtheria/tetanus/pertussis (acellular) Vaccine (ADAcel) 0.5 milliLiter(s) IntraMuscular once  ibuprofen  Tablet. 600 milliGRAM(s) Oral every 6 hours  prenatal multivitamin 1 Tablet(s) Oral daily  sodium chloride 0.9% lock flush 3 milliLiter(s) IV Push every 8 hours    MEDICATIONS  (PRN):  benzocaine 20%/menthol 0.5% Spray 1 Spray(s) Topical every 6 hours PRN for Perineal discomfort  dibucaine 1% Ointment 1 Application(s) Topical every 6 hours PRN Perineal discomfort  diphenhydrAMINE 25 milliGRAM(s) Oral every 6 hours PRN Pruritus  hydrocortisone 1% Cream 1 Application(s) Topical every 6 hours PRN Moderate Pain (4-6)  lanolin Ointment 1 Application(s) Topical every 6 hours PRN nipple soreness  magnesium hydroxide Suspension 30 milliLiter(s) Oral two times a day PRN Constipation  oxyCODONE    IR 5 milliGRAM(s) Oral every 3 hours PRN Moderate to Severe Pain (4-10)  oxyCODONE    IR 5 milliGRAM(s) Oral once PRN Moderate to Severe Pain (4-10)  pramoxine 1%/zinc 5% Cream 1 Application(s) Topical every 4 hours PRN Moderate Pain (4-6)  simethicone 80 milliGRAM(s) Chew every 4 hours PRN Gas  witch hazel Pads 1 Application(s) Topical every 4 hours PRN Perineal discomfort        Assessment and Plan  PPD #2 s/p   Patient with significant right sided pelvic pain- unable to completely ambulate  pain control encouraged   Pelvic xray ordered   abdominal binder given and patient encouraged to use   PP & PPD Instructions reviewed.  CPC.  D/C home tomorrow.

## 2022-07-25 VITALS
RESPIRATION RATE: 17 BRPM | HEART RATE: 93 BPM | SYSTOLIC BLOOD PRESSURE: 125 MMHG | TEMPERATURE: 98 F | OXYGEN SATURATION: 100 % | DIASTOLIC BLOOD PRESSURE: 79 MMHG

## 2022-07-25 RX ADMIN — Medication 600 MILLIGRAM(S): at 12:46

## 2022-07-25 RX ADMIN — Medication 600 MILLIGRAM(S): at 12:15

## 2022-07-25 RX ADMIN — Medication 975 MILLIGRAM(S): at 03:01

## 2022-07-25 RX ADMIN — Medication 975 MILLIGRAM(S): at 03:34

## 2022-07-25 RX ADMIN — Medication 0.5 MILLILITER(S): at 11:51

## 2022-07-25 RX ADMIN — Medication 600 MILLIGRAM(S): at 05:51

## 2022-07-25 RX ADMIN — Medication 975 MILLIGRAM(S): at 09:06

## 2022-07-25 RX ADMIN — Medication 1 TABLET(S): at 12:20

## 2022-07-25 RX ADMIN — Medication 975 MILLIGRAM(S): at 08:15

## 2022-07-25 RX ADMIN — Medication 600 MILLIGRAM(S): at 06:20

## 2022-07-25 NOTE — PROGRESS NOTE ADULT - ASSESSMENT
Assessment and Plan  PPD #3 s/p . History of CHTN in the past, was started on PO Labetalol in the pregnancy but per patient, discontinued meds for BP parameters, her BP were always WNL, never exceeded 110/60 bp. Hx of gastric sleeve.     Patient reported pelvic pain that has now improved, reported symptoms consistent with pubic symphysis pain. Pelvic xray negative. Abdominal binder encouraged for support.     Her pain is well controlled.   She is tolerating a regular diet and passing flatus.   Denies N/V. Denies CP/SOB/lightheadedness/dizziness.   She is ambulating without difficulty.   Voiding spontaneously.    Patient is stable and doing well postpartum  - Continue regular diet.  - Increase ambulation.  - Continue motrin, tylenol, oxycodone PRN for pain control.   -Encourage breastfeeding.    -PP educational material reviewed and provided.  -PP & PPD Instructions reviewed.  -PEC s/s reviewed with patient    RX BP cuff sent to pharmacy  Patient to keep BP log and take BP TID  Patient to follow up @ [facility] within 1 week    -Discharge planning>>>D/C home today after MD Jaimes sees patient and clears for discharge    -Follow up @ [facility] in 1 week for postpartum BP CHECK visit  [phone]      Discussed with MD Theodore Dong

## 2022-07-25 NOTE — PROGRESS NOTE ADULT - SUBJECTIVE AND OBJECTIVE BOX
Post-partum Note,   She is a  31y woman who is now post-partum day: 3    Subjective:  The patient feels well.  She is ambulating.   She is tolerating regular diet.  She denies nausea and vomiting; denies fever.  She is voiding.  Her pain is controlled.  She reports normal postpartum bleeding.  She is breastfeeding.  She is formula feeding.  She is bonding with infant.    Physical exam:    Vital Signs Last 24 Hrs  T(C): 36.6 (2022 12:30), Max: 36.8 (2022 05:54)  T(F): 97.9 (2022 12:30), Max: 98.2 (2022 05:54)  HR: 93 (2022 12:30) (62 - 93)  BP: 125/79 (2022 12:30) (120/83 - 125/79)  BP(mean): --  RR: 17 (2022 12:30) (16 - 18)  SpO2: 100% (2022 12:30) (100% - 100%)        Gen: NAD  Breast: Soft, nontender, not engorged.  Abdomen: Soft, nontender, no distension , firm uterine fundus at umbilicus.  Pelvic: Normal lochia noted  Ext: No calf tenderness    LABS:      Rubella status:     Allergies    No Known Allergies    Intolerances      MEDICATIONS  (STANDING):  acetaminophen     Tablet .. 975 milliGRAM(s) Oral <User Schedule>  diphtheria/tetanus/pertussis (acellular) Vaccine (ADAcel) 0.5 milliLiter(s) IntraMuscular once  ibuprofen  Tablet. 600 milliGRAM(s) Oral every 6 hours  prenatal multivitamin 1 Tablet(s) Oral daily  sodium chloride 0.9% lock flush 3 milliLiter(s) IV Push every 8 hours    MEDICATIONS  (PRN):  benzocaine 20%/menthol 0.5% Spray 1 Spray(s) Topical every 6 hours PRN for Perineal discomfort  dibucaine 1% Ointment 1 Application(s) Topical every 6 hours PRN Perineal discomfort  diphenhydrAMINE 25 milliGRAM(s) Oral every 6 hours PRN Pruritus  hydrocortisone 1% Cream 1 Application(s) Topical every 6 hours PRN Moderate Pain (4-6)  lanolin Ointment 1 Application(s) Topical every 6 hours PRN nipple soreness  magnesium hydroxide Suspension 30 milliLiter(s) Oral two times a day PRN Constipation  oxyCODONE    IR 5 milliGRAM(s) Oral every 3 hours PRN Moderate to Severe Pain (4-10)  oxyCODONE    IR 5 milliGRAM(s) Oral once PRN Moderate to Severe Pain (4-10)  pramoxine 1%/zinc 5% Cream 1 Application(s) Topical every 4 hours PRN Moderate Pain (4-6)  simethicone 80 milliGRAM(s) Chew every 4 hours PRN Gas  witch hazel Pads 1 Application(s) Topical every 4 hours PRN Perineal discomfort

## 2022-07-26 ENCOUNTER — NON-APPOINTMENT (OUTPATIENT)
Age: 32
End: 2022-07-26

## 2022-07-26 RX ORDER — ACETAMINOPHEN 500 MG/1
500 TABLET ORAL
Refills: 0 | Status: ACTIVE | COMMUNITY
Start: 2022-07-26

## 2022-07-26 RX ORDER — OMEPRAZOLE 40 MG/1
40 CAPSULE, DELAYED RELEASE ORAL
Refills: 3 | Status: DISCONTINUED | COMMUNITY
End: 2022-07-26

## 2022-07-26 RX ORDER — IBUPROFEN 600 MG/1
600 TABLET, FILM COATED ORAL EVERY 6 HOURS
Refills: 0 | Status: ACTIVE | COMMUNITY
Start: 2022-07-26

## 2022-07-26 RX ORDER — METOPROLOL SUCCINATE 25 MG/1
25 TABLET, EXTENDED RELEASE ORAL DAILY
Qty: 90 | Refills: 1 | Status: DISCONTINUED | COMMUNITY
Start: 2021-01-14 | End: 2022-07-26

## 2022-07-26 RX ORDER — METOPROLOL SUCCINATE 25 MG/1
25 TABLET, EXTENDED RELEASE ORAL
Qty: 90 | Refills: 3 | Status: DISCONTINUED | COMMUNITY
Start: 2020-11-17 | End: 2022-07-26

## 2022-07-26 RX ORDER — AMLODIPINE BESYLATE AND BENAZEPRIL HYDROCHLORIDE 5; 10 MG/1; MG/1
5-10 CAPSULE ORAL
Qty: 30 | Refills: 0 | Status: DISCONTINUED | COMMUNITY
Start: 2020-11-01 | End: 2022-07-26

## 2022-07-26 RX ORDER — PRENATAL VIT NO.126/IRON/FOLIC 28MG-0.8MG
28-0.8 TABLET ORAL DAILY
Refills: 0 | Status: ACTIVE | COMMUNITY
Start: 2022-07-26

## 2022-07-27 PROBLEM — D64.9 ANEMIA, UNSPECIFIED: Chronic | Status: ACTIVE | Noted: 2022-07-21

## 2022-07-27 PROBLEM — Z33.2 ENCOUNTER FOR ELECTIVE TERMINATION OF PREGNANCY: Chronic | Status: ACTIVE | Noted: 2022-07-21

## 2022-07-27 PROBLEM — R00.2 PALPITATIONS: Chronic | Status: ACTIVE | Noted: 2022-07-21

## 2022-07-28 ENCOUNTER — NON-APPOINTMENT (OUTPATIENT)
Age: 32
End: 2022-07-28

## 2022-08-01 ENCOUNTER — NON-APPOINTMENT (OUTPATIENT)
Age: 32
End: 2022-08-01

## 2022-08-08 ENCOUNTER — NON-APPOINTMENT (OUTPATIENT)
Age: 32
End: 2022-08-08

## 2022-08-30 ENCOUNTER — APPOINTMENT (OUTPATIENT)
Dept: CARDIOLOGY | Facility: CLINIC | Age: 32
End: 2022-08-30

## 2022-08-30 ENCOUNTER — NON-APPOINTMENT (OUTPATIENT)
Age: 32
End: 2022-08-30

## 2022-08-30 VITALS
HEIGHT: 65 IN | DIASTOLIC BLOOD PRESSURE: 86 MMHG | BODY MASS INDEX: 27.62 KG/M2 | OXYGEN SATURATION: 99 % | SYSTOLIC BLOOD PRESSURE: 122 MMHG | TEMPERATURE: 98.2 F | HEART RATE: 75 BPM

## 2022-08-30 VITALS — WEIGHT: 166 LBS | BODY MASS INDEX: 27.62 KG/M2

## 2022-08-30 DIAGNOSIS — I47.2 VENTRICULAR TACHYCARDIA: ICD-10-CM

## 2022-08-30 DIAGNOSIS — R00.2 PALPITATIONS: ICD-10-CM

## 2022-08-30 PROCEDURE — 93000 ELECTROCARDIOGRAM COMPLETE: CPT

## 2022-08-30 PROCEDURE — 99203 OFFICE O/P NEW LOW 30 MIN: CPT | Mod: 25

## 2022-08-30 NOTE — HISTORY OF PRESENT ILLNESS
[FreeTextEntry1] : 31 year old woman  who delivered at 40.1 weeks on 22\par History of hypertension and seen by Dr. Heath for palpitations in .Diagnosed at that itme with symptomatic NSVT\par She wore a monitor (-2020) that showed sinus rhythm between  with average of 103 . Overall burden <1%\par Cardiac MRI 21 that showed normal LV size and function, normal RV size and function, no delayed enhancement or fatty infiltration. \par \par Prior to pregnancy she was on Amlodipine and Metoprolol\par During pregnancy she was on Labetalol 100 mg BID \par BP is good

## 2022-08-30 NOTE — DISCUSSION/SUMMARY
[FreeTextEntry1] : 31 year old woman  who delivered  with known PVCs and HTN\par Continue Labetalol 100 mg BID\par FU in 6 months [EKG obtained to assist in diagnosis and management of assessed problem(s)] : EKG obtained to assist in diagnosis and management of assessed problem(s)

## 2023-01-30 ENCOUNTER — APPOINTMENT (OUTPATIENT)
Dept: CARDIOLOGY | Facility: CLINIC | Age: 33
End: 2023-01-30

## 2024-05-23 NOTE — OB RN PATIENT PROFILE - FUNCTIONAL ASSESSMENT - DAILY ACTIVITY ASSESSMENT TYPE
Attempted to reach patient, no answer, message left to call clinic.  My chart message also sent.     Admission

## 2025-07-07 NOTE — OB PROVIDER LABOR PROGRESS NOTE - NS_SUBJECTIVE/OBJECTIVE_OBGYN_ALL_OB_FT
R1 OB Labor Note    S: Patient seen and examined at bedside.     T(C): 36.4 (07-22-22 @ 00:45), Max: 36.9 (07-21-22 @ 18:47)  HR: 83 (07-22-22 @ 01:27) (62 - 93)  BP: 121/86 (07-22-22 @ 01:08) (108/65 - 133/90)  BP(mean): --  ABP: --  ABP(mean): --  RR: 15 (07-21-22 @ 15:53) (15 - 15)  SpO2: 90% (07-22-22 @ 01:27) (90% - 100%)  Wt(kg): --  CVP(mm Hg): --  CI: --  CAPILLARY BLOOD GLUCOSE       N/A      07-21 @ 07:01  -  07-22 @ 01:29  --------------------------------------------------------  IN:    Lactated Ringers: 1875 mL  Total IN: 1875 mL    OUT:  Total OUT: 0 mL    Total NET: 1875 mL yes